# Patient Record
Sex: FEMALE | Race: ASIAN | NOT HISPANIC OR LATINO | Employment: OTHER | RURAL
[De-identification: names, ages, dates, MRNs, and addresses within clinical notes are randomized per-mention and may not be internally consistent; named-entity substitution may affect disease eponyms.]

---

## 2021-03-19 ENCOUNTER — OFFICE VISIT (OUTPATIENT)
Dept: OTOLARYNGOLOGY | Facility: CLINIC | Age: 71
End: 2021-03-19

## 2021-03-19 VITALS — TEMPERATURE: 98.6 F | WEIGHT: 161 LBS | OXYGEN SATURATION: 98 % | BODY MASS INDEX: 26.82 KG/M2 | HEIGHT: 65 IN

## 2021-03-19 DIAGNOSIS — K11.8 PAROTID MASS: Primary | ICD-10-CM

## 2021-03-19 PROCEDURE — 99204 OFFICE O/P NEW MOD 45 MIN: CPT | Performed by: OTOLARYNGOLOGY

## 2021-03-19 RX ORDER — FEXOFENADINE HCL 180 MG/1
180 TABLET ORAL DAILY
COMMUNITY

## 2021-03-19 RX ORDER — HYDROCHLOROTHIAZIDE 12.5 MG/1
TABLET ORAL
COMMUNITY

## 2021-03-19 RX ORDER — ROSUVASTATIN CALCIUM 10 MG/1
TABLET, COATED ORAL
COMMUNITY
Start: 2021-03-02

## 2021-03-23 NOTE — PROGRESS NOTES
Elias Frausto is a 70 y.o. female.       History of Present Illness     Patient reports she has had swelling in her right neck that began back in September.  She just woke up and it was swollen.  She says it has improved over time.  Was apparently seen by Dr. Morel of general surgery who thought that I needed to see the patient.    The following portions of the patient's history were reviewed and updated as appropriate: allergies, current medications, past family history, past medical history, past social history, past surgical history and problem list.     reports that she has never smoked. She has never used smokeless tobacco. Alcohol use questions deferred to the physician. Drug use questions deferred to the physician.   Patient is not a tobacco user and has not been counseled for use of tobacco products      Review of Systems        Objective   Physical Exam  Ears: External ears no deformity, canals no discharge, tympanic membranes intact clear and mobile bilaterally.  Nose: No discharge or purulence  Oral cavity: No masses or lesions  Pharynx: No erythema or exudate.  Mirror exam the larynx shows no evidence of neoplasm.  Endolarynx shows vocal cord mobility intact  Neck: 4 cm mass of the right parotid.  This is rubbery, well-circumscribed, suspicious for Warthin's tumor or pleomorphic adenoma      Assessment/Plan   Diagnoses and all orders for this visit:    1. Parotid mass (Primary)      Plan: Explained to the patient that this is almost certainly a parotid neoplasm.  While it is likely benign this cannot be assumed for certain and even if it is benign, pleomorphic adenoma has potential for malignant transformation.  I recommended CT scan followed by fine-needle aspiration biopsy.  Patient states she does not want to have any further work-up at this time despite being informed that there is a risk that this is a malignancy that is being left untreated/undiagnosed.  All she would agree to is a  4-month reevaluation.  She is to call sooner if the mass changes or if she decides she is willing to undergo further work-up.

## 2021-07-23 ENCOUNTER — OFFICE VISIT (OUTPATIENT)
Dept: OTOLARYNGOLOGY | Facility: CLINIC | Age: 71
End: 2021-07-23

## 2021-07-23 VITALS — WEIGHT: 154 LBS | BODY MASS INDEX: 25.66 KG/M2 | HEART RATE: 85 BPM | OXYGEN SATURATION: 98 % | HEIGHT: 65 IN

## 2021-07-23 DIAGNOSIS — K11.8 MASS OF RIGHT PAROTID GLAND: Primary | ICD-10-CM

## 2021-07-23 PROCEDURE — 99213 OFFICE O/P EST LOW 20 MIN: CPT | Performed by: OTOLARYNGOLOGY

## 2021-07-23 RX ORDER — LIDOCAINE 50 MG/G
PATCH TOPICAL
COMMUNITY
Start: 2021-07-08

## 2021-07-23 NOTE — PROGRESS NOTES
"Subjective   Everette Frausto is a 70 y.o. female.       History of Present Illness     Patient was seen previously with a right-sided parotid mass.  Declined all work-up and would only agree to a follow-up visit.  She says she thinks it is \"slowly going away\".  Denies any pain, facial numbness, weakness    The following portions of the patient's history were reviewed and updated as appropriate: allergies, current medications, past family history, past medical history, past social history, past surgical history and problem list.     reports that she has never smoked. She has never used smokeless tobacco. Alcohol use questions deferred to the physician. Drug use questions deferred to the physician.   Patient is not a tobacco user and has not been counseled for use of tobacco products      Review of Systems        Objective   Physical Exam  Neck: 4 cm mass in the right parotid gland, rubbery, well-circumscribed, unchanged from previous.  No lymphadenopathy elsewhere in the neck.  Oral cavity shows no masses or lesions      Assessment/Plan   Diagnoses and all orders for this visit:    1. Mass of right parotid gland (Primary)      Plan: Again explained to the patient that this is almost certainly a neoplasm.  Explained to her that this will not likely go away on its own.  After consideration she is agreeable to a CT scan followed by needle biopsy.  I told her if the needle biopsy is benign then we could consider proceeding without additional surgery.      "

## 2021-08-08 ENCOUNTER — TRANSCRIBE ORDERS (OUTPATIENT)
Dept: GENERAL RADIOLOGY | Facility: HOSPITAL | Age: 71
End: 2021-08-08

## 2021-08-08 DIAGNOSIS — K11.8 MASS OF RIGHT PAROTID GLAND: Primary | ICD-10-CM

## 2021-08-12 ENCOUNTER — APPOINTMENT (OUTPATIENT)
Dept: CT IMAGING | Facility: HOSPITAL | Age: 71
End: 2021-08-12